# Patient Record
Sex: FEMALE | URBAN - NONMETROPOLITAN AREA
[De-identification: names, ages, dates, MRNs, and addresses within clinical notes are randomized per-mention and may not be internally consistent; named-entity substitution may affect disease eponyms.]

---

## 2024-10-16 ENCOUNTER — APPOINTMENT (RX ONLY)
Dept: URBAN - NONMETROPOLITAN AREA CLINIC 14 | Facility: CLINIC | Age: 54
Setting detail: DERMATOLOGY
End: 2024-10-16

## 2024-10-16 VITALS — HEIGHT: 64 IN | WEIGHT: 260 LBS

## 2024-10-16 DIAGNOSIS — L82.1 OTHER SEBORRHEIC KERATOSIS: ICD-10-CM

## 2024-10-16 DIAGNOSIS — L259 CONTACT DERMATITIS AND OTHER ECZEMA, UNSPECIFIED CAUSE: ICD-10-CM | Status: WORSENING

## 2024-10-16 PROBLEM — L30.8 OTHER SPECIFIED DERMATITIS: Status: ACTIVE | Noted: 2024-10-16

## 2024-10-16 PROCEDURE — 96372 THER/PROPH/DIAG INJ SC/IM: CPT

## 2024-10-16 PROCEDURE — ? PRESCRIPTION

## 2024-10-16 PROCEDURE — ? COUNSELING

## 2024-10-16 PROCEDURE — ? SEPARATE AND IDENTIFIABLE DOCUMENTATION

## 2024-10-16 PROCEDURE — ? PRESCRIPTION MEDICATION MANAGEMENT

## 2024-10-16 PROCEDURE — ? INJECTION

## 2024-10-16 PROCEDURE — ? MEDICATION COUNSELING

## 2024-10-16 PROCEDURE — 99204 OFFICE O/P NEW MOD 45 MIN: CPT | Mod: 25

## 2024-10-16 RX ORDER — TRIAMCINOLONE ACETONIDE 1 MG/G
1 APPLICATION CREAM TOPICAL BID
Qty: 453.6 | Refills: 4 | Status: ERX | COMMUNITY
Start: 2024-10-16

## 2024-10-16 RX ORDER — TRIAMCINOLONE ACETONIDE 1 MG/G
1 APPLICATION CREAM TOPICAL BID
Qty: 453.6 | Refills: 4 | Status: ERX

## 2024-10-16 RX ADMIN — TRIAMCINOLONE ACETONIDE 1 APPLICATION: 1 CREAM TOPICAL at 00:00

## 2024-10-16 ASSESSMENT — LOCATION DETAILED DESCRIPTION DERM
LOCATION DETAILED: LEFT INFERIOR UPPER BACK
LOCATION DETAILED: LEFT BUTTOCK
LOCATION DETAILED: RIGHT MID TEMPLE

## 2024-10-16 ASSESSMENT — LOCATION ZONE DERM
LOCATION ZONE: TRUNK
LOCATION ZONE: FACE

## 2024-10-16 ASSESSMENT — LOCATION SIMPLE DESCRIPTION DERM
LOCATION SIMPLE: LEFT UPPER BACK
LOCATION SIMPLE: RIGHT TEMPLE
LOCATION SIMPLE: LEFT BUTTOCK

## 2024-10-16 ASSESSMENT — SEVERITY ASSESSMENT 2020: SEVERITY 2020: SEVERE

## 2024-10-16 NOTE — HPI: ITCHING
What Type Of Note Output Would You Prefer (Optional)?: Bullet Format
How Did Your Itching Occur?: sudden in onset (over a period of weeks to a few months)
Additional History: Patient says her skin is very dry and the itching is very intense.  This started in 06/2024.  She had a total left knee replacement in 05/2024 which she confirmed it was titanium.   She previously had right total knee replacement in 2023 and that was also titanium.  She has a nickel allergy; no blistering to exposure just very itchy.  She almost went through of a divorce at the beginning of June 2023 and was diagnosed with Neurodermatitis.  She moved this year.   She reports of not having any lab work since this started.  She has had no problems with skin prior to this.

## 2024-10-16 NOTE — PROCEDURE: INJECTION
Consent: The risks of the medication were reviewed with the patient.
Dose Administered (Numbers Only - Mg, G, Mcg, Units, Cc): 40
Bill J-Code: yes
Type Of Vial Used?: Multi-Dose
Dose Administered (Numbers Only - Mg, G, Mcg, Units, Cc): 0.5
Hide Second Medication?: No
Lot # (Optional): [id number]
Expiration Date (Optional): 12/2024
Treatment Number: 0
Total Volume Injected In Cc (Will Not Affected Billing): 1.5
Expiration Date (Optional): 01/2026
Medication (2) And Associated J-Code Units: Triamcinolone acetonide, 10mg
Medication (1) And Associated J-Code Units: Betamethasone Acetate, 3mg and Betamethasone Sodium Phosphate, 3mg
Post-Care Instructions: I reviewed with the patient in detail post-care instructions. Patient understands to keep the injection sites clean and call the clinic if there is any redness, swelling or pain.
Procedure Information: Please note that the numeric value listed in the Medication (1) and associated J-code units and Medication (2) and associated J-code units variables are j-code amounts and do not represent either the concentration or the total amount of the medications injected.  I strongly recommend selecting no to the Render J-code information in note question. This will allow your note to be more clear. If you are billing j-codes with your injection codes you need to document the total amount of the medication injected. This amount should match the j-code units. For example, if you are injecting Triamcinolone 40mg as an intramuscular injection you would select 40 for the dose field.. This would allow you to document  with 4 units (40mg = 10mg x 4). The total volume is not used to calculate j-codes only the amount of the medication administered.
Route: IM
Lot # (Optional): [id number]
Detail Level: None

## 2024-10-16 NOTE — PROCEDURE: PRESCRIPTION MEDICATION MANAGEMENT
Plan: Follow up in 3-4 weeks
Discontinue Regimen: Dryer sheets and scented lotions
Initiate Treatment: Rx Triamcinolone 0.1% cream apply to affected area BID for 2 weeks, take 1 week break, repeat prn flares.\\nMoisturize with Cerave cream
Detail Level: Zone
Render In Strict Bullet Format?: Yes

## 2024-10-16 NOTE — PROCEDURE: MEDICATION COUNSELING
November 10, 2021       Ruthie M Praveen  [address] [id number]        Please read these instructions at least one week prior to your procedure. If you have any questions, please call [phone].    WHAT IS A 48 HOUR BRAVO PH TEST?  ?  A BRAVO test measures and records the PH in your esophagus to determine if you have gastroesophageal reflux disease (GERD). A valve called the lower esophageal sphincter controls the passage of food from the esophagus to the stomach when you swallow. It remains tightly closed except when you swallow food. When this muscle fails to close or opens spontaneously, the acid and food contents of the stomach can travel backward into the esophagus. The PH test measures how often stomach contents reflux into the lower esophagus and how much acid the reflux contains.  HOW DOES THE BRAVO TEST WORK?  ?  A small capsule about the size of a gel cap is temporarily attached to the wall of the esophagus during an upper endoscopy (EGD). The capsule contains a radio transmitter that measures the PH levels in the esophagus and transmits readings to the recorder (about the size of a pager) that you will attach to your belt or waistband. The recorder will have buttons that you will press to record symptoms of GERD. See the diagram on the next page. You will be asked to maintain a diary to record certain events. The nurse in the surgery center will explain the details.  ?  BEFORE WE BEGIN, PLEASE LET THE DOCTOR KNOW IF YOU HAVE A PACEMAKER, IMPLANTABLE CARDIAC DEFIBRILLATOR (ICD), BLEEDING PROBLEMS OR PROBLEMS REGARDING YOUR ESOPHAGUS.  ABOUT THE RECORDER  1. You must keep the recorder within 3 feet of your body at all times. If you get too far away, the recorder will beep once and flash C1 or C2 on its screen. If this happens, just hold the  next to your chest for 30 seconds to reset it.  2. DO NOT GET THE RECORDER WET. While you take a shower, place it on the floor  next to the shower.   3. There are 3 buttons on the recorder that you will push when y ou have a symptom. One is for HEARTBURN AND OTHER. One is for REGURGITATION. One is for CHEST PAIN.  ONCE THE TEST BEGINS  1. You will record your activities and eating for 48 hours.  2. Don't change or reduce your activities during the day. Doing so would give a false result and be considered useless.  3. Use the clock on the recorder to record your times.  4. Eat your regular meals at the usual times. If you skip eating, the test will give false results. Eat at least 2 meals per day. Eat foods that tend to increase your symptoms without making yourself miserable. Avoid snacking. NO hard candy, lozenges, gum chewing. You will record what you ate or drank, the time you started eating and the time you finished. Se diagram on next page.  5. Remain upright during the day unless napping or laying down is part of your routine. You will record these times too.  6. Record the name of the medications you take and when you took them.  7. Record any symptoms you are having when they started and when they ended.  AFTER THE TEST  ?  1. You will return the recorder and your diary back to the surgery center, the same place that issued it.  2. You will resume your normal diet, medications and activities.  3. The capsule will detach from the esophagus and pass thru the digestive tract in 7-10 days.  DO NOT have an MRI for 30 days after the test.      CANCELLATION AND RESCHEDULE POLICY:  Due to the busy schedules of our patients, surgery centers, and physicians it is important that appointments for procedures are kept. In the event that you need to reschedule or cancel your procedure, we require 48-hours' notice. Please call the clinic where you scheduled your procedure appointment.    MEDICATION CHANGES BEFORE THE PROCEDURE:  You will be instructed during the call from the preadmission nurse at the designated Surgery Center regarding the  medications you should take the morning of your procedure. Questions regarding pre-procedure medications should be directed to the Surgery Center listed on the first page of these instructions.     If you are taking a blood thinner (such as Coumadin, Fragmin, Lovenox) or medications that affect your platelets (such as Aggrenox, Agrylin, Arixtra, Hydrea, Persantine, Trental, Plavix), you must stop taking them for 5 days prior to your procedure. If you have any questions or concerns about holding any of these medications, please contact your cardiologist or primary care physician.    If you take a scheduled Aspirin daily, you do not need to hold it prior to the procedure.    If you are diabetic and require insulin please check with the doctor for instructions before you begin this preparation to change the dosage of your insulin for those days.  It is fine to take Tylenol if needed.      Do not take the following medications listed below for seven (7) days before your scheduled procedure (unless specifically directed otherwise by your physician):    For 7 days prior:  Motrin  Ibuprofen  Advil  Aleve  Vioxx  Celebrex        The medications below should not be taken for 7 days prior to the procedure:  Aciphex (Rabeprazole), Prevacid (Lansoprazole), Prilosec (Omeprazole), Protonix (Pantoprazole), or Nexium (Esomeprazole): ?    The acid suppressing medications below should not be taken for 7 days prior to the procedure:  Pepcid (Famotidine)   Zantac (Ranitidine)  Tagamet (Cimetidine)    For 8-10 hours prior to the procedure do not take any TUMS or any other antacids such as Mylanta, Maalox, Rolaids or Gaviscon      If you take antibiotics prior to going to the dentist, please call our office to discuss if you need to have antibiotics before your procedure.      DAY OF PROCEDURE:   You may not have anything to eat or drink after midnight prior to your procedure.  Arrive for your procedure 1 hour prior to your scheduled  time.  After your procedure, you may resume eating and drinking with no limitations.    You will receive sedation during the procedure:  You must have someone to drive you home. A taxi is not acceptable in this situation. If you do not have transportation arranged, you will not be able to have the procedure.  You will need someone available to stay with you or check in on you frequently during the rest of the day/evening until the sedation has completely worn off.  Do not plan on going to work or doing anything after the procedure, as you will be drowsy for most of the afternoon.    TWO (2) DAYS AFTER PROCEDURE:  Return the monitoring device/ to [facility]    POST PROCEDURE INSTRUCTIONS:  Do not take the following medications listed below for the number of days indicated after your scheduled procedure unless specifically directed otherwise by your physician:    FOR SEVEN (7) DAYS FOLLOWING:  Aspirin  Motrin  Ibuprofen  Advil  Aleve  Vioxx  Celebrex  If you are taking a blood thinner such as Coumadin, please check with the doctor for instructions on when to resume this medication.    RESULTS:  If your results are normal, you will receive a letter of notification from our office.  Results will be reviewed with you by the doctor if you have been told to return to the clinic for a follow up visit.  If your results require immediate follow up, the doctor or the office staff will contact you personally and prior to your follow up office visit.    Thank you for entrusting your care to [facility]   Carac Counseling:  I discussed with the patient the risks of Carac including but not limited to erythema, scaling, itching, weeping, crusting, and pain.